# Patient Record
Sex: MALE
[De-identification: names, ages, dates, MRNs, and addresses within clinical notes are randomized per-mention and may not be internally consistent; named-entity substitution may affect disease eponyms.]

---

## 2020-05-11 ENCOUNTER — HOSPITAL ENCOUNTER (OUTPATIENT)
Dept: HOSPITAL 90 - EDH | Age: 65
Setting detail: OBSERVATION
LOS: 2 days | Discharge: HOME | End: 2020-05-13
Attending: INTERNAL MEDICINE | Admitting: INTERNAL MEDICINE
Payer: COMMERCIAL

## 2020-05-11 VITALS — HEIGHT: 65 IN | WEIGHT: 198.2 LBS | BODY MASS INDEX: 33.02 KG/M2

## 2020-05-11 VITALS — DIASTOLIC BLOOD PRESSURE: 85 MMHG | SYSTOLIC BLOOD PRESSURE: 127 MMHG

## 2020-05-11 DIAGNOSIS — F13.20: ICD-10-CM

## 2020-05-11 DIAGNOSIS — E78.2: ICD-10-CM

## 2020-05-11 DIAGNOSIS — E78.00: ICD-10-CM

## 2020-05-11 DIAGNOSIS — G47.00: ICD-10-CM

## 2020-05-11 DIAGNOSIS — Z79.899: ICD-10-CM

## 2020-05-11 DIAGNOSIS — N18.2: ICD-10-CM

## 2020-05-11 DIAGNOSIS — E66.9: ICD-10-CM

## 2020-05-11 DIAGNOSIS — I25.119: ICD-10-CM

## 2020-05-11 DIAGNOSIS — Z95.5: ICD-10-CM

## 2020-05-11 DIAGNOSIS — K57.30: ICD-10-CM

## 2020-05-11 DIAGNOSIS — Z91.041: ICD-10-CM

## 2020-05-11 DIAGNOSIS — G47.33: ICD-10-CM

## 2020-05-11 DIAGNOSIS — I25.2: ICD-10-CM

## 2020-05-11 DIAGNOSIS — K21.9: ICD-10-CM

## 2020-05-11 DIAGNOSIS — Z79.82: ICD-10-CM

## 2020-05-11 DIAGNOSIS — R07.89: Primary | ICD-10-CM

## 2020-05-11 DIAGNOSIS — I13.10: ICD-10-CM

## 2020-05-11 DIAGNOSIS — F32.0: ICD-10-CM

## 2020-05-11 LAB
ALBUMIN SERPL-MCNC: 3.5 G/DL (ref 3.5–5)
ALT SERPL-CCNC: 37 U/L (ref 12–78)
APTT PPP: 25.4 SEC (ref 26.3–35.5)
AST SERPL-CCNC: 23 U/L (ref 10–37)
BASOPHILS NFR BLD AUTO: 0.5 % (ref 0–5)
BILIRUB SERPL-MCNC: 0.3 MG/DL (ref 0.2–1)
BUN SERPL-MCNC: 20 MG/DL (ref 7–18)
CHLORIDE SERPL-SCNC: 109 MMOL/L (ref 101–111)
CHOLEST SERPL-MCNC: 173 MG/DL (ref ?–200)
CK SERPL-CCNC: 142 U/L (ref 21–232)
CO2 SERPL-SCNC: 29 MMOL/L (ref 21–32)
CREAT SERPL-MCNC: 1.5 MG/DL (ref 0.5–1.5)
EOSINOPHIL NFR BLD AUTO: 2.3 % (ref 0–8)
ERYTHROCYTE [DISTWIDTH] IN BLOOD BY AUTOMATED COUNT: 12.2 % (ref 11–15.5)
GFR SERPL CREATININE-BSD FRML MDRD: 50 ML/MIN (ref 60–?)
GLUCOSE SERPL-MCNC: 138 MG/DL (ref 70–105)
HBA1C MFR BLD: 5.3 % (ref 4–6)
HCT VFR BLD AUTO: 42.1 % (ref 42–54)
HDLC SERPL-MCNC: 110 MG/DL (ref 29–71)
INR PPP: 0.89 (ref 0.85–1.15)
LDLC SERPL CALC-MCNC: 86 MG/DL (ref 0–99)
LYMPHOCYTES NFR SPEC AUTO: 28.1 % (ref 21–51)
MCH RBC QN AUTO: 30.5 PG (ref 27–33)
MCHC RBC AUTO-ENTMCNC: 33.7 G/DL (ref 32–36)
MCV RBC AUTO: 90.5 FL (ref 79–99)
MONOCYTES NFR BLD AUTO: 6.2 % (ref 3–13)
NEUTROPHILS NFR BLD AUTO: 62.7 % (ref 40–77)
NRBC BLD MANUAL-RTO: 0 % (ref 0–0.19)
PLATELET # BLD AUTO: 235 K/UL (ref 130–400)
POTASSIUM SERPL-SCNC: 3.8 MMOL/L (ref 3.5–5.1)
PROT SERPL-MCNC: 6.8 G/DL (ref 6–8.3)
PROTHROMBIN TIME: 9.7 SEC (ref 9.6–11.6)
RBC # BLD AUTO: 4.65 MIL/UL (ref 4.5–6.2)
SODIUM SERPL-SCNC: 145 MMOL/L (ref 136–145)
TRIGL SERPL-MCNC: 438 MG/DL (ref 30–200)
TSH SERPL DL<=0.05 MIU/L-ACNC: 2.15 UIU/ML (ref 0.36–3.74)
WBC # BLD AUTO: 8.4 K/UL (ref 4.8–10.8)

## 2020-05-11 PROCEDURE — 71045 X-RAY EXAM CHEST 1 VIEW: CPT

## 2020-05-11 PROCEDURE — 85025 COMPLETE CBC W/AUTO DIFF WBC: CPT

## 2020-05-11 PROCEDURE — 80061 LIPID PANEL: CPT

## 2020-05-11 PROCEDURE — 96374 THER/PROPH/DIAG INJ IV PUSH: CPT

## 2020-05-11 PROCEDURE — 84443 ASSAY THYROID STIM HORMONE: CPT

## 2020-05-11 PROCEDURE — 83880 ASSAY OF NATRIURETIC PEPTIDE: CPT

## 2020-05-11 PROCEDURE — 96372 THER/PROPH/DIAG INJ SC/IM: CPT

## 2020-05-11 PROCEDURE — 85730 THROMBOPLASTIN TIME PARTIAL: CPT

## 2020-05-11 PROCEDURE — 83036 HEMOGLOBIN GLYCOSYLATED A1C: CPT

## 2020-05-11 PROCEDURE — 36415 COLL VENOUS BLD VENIPUNCTURE: CPT

## 2020-05-11 PROCEDURE — 80048 BASIC METABOLIC PNL TOTAL CA: CPT

## 2020-05-11 PROCEDURE — 78452 HT MUSCLE IMAGE SPECT MULT: CPT

## 2020-05-11 PROCEDURE — 85610 PROTHROMBIN TIME: CPT

## 2020-05-11 PROCEDURE — 80053 COMPREHEN METABOLIC PANEL: CPT

## 2020-05-11 PROCEDURE — 82550 ASSAY OF CK (CPK): CPT

## 2020-05-11 PROCEDURE — 99285 EMERGENCY DEPT VISIT HI MDM: CPT

## 2020-05-11 PROCEDURE — 93017 CV STRESS TEST TRACING ONLY: CPT

## 2020-05-11 PROCEDURE — 93005 ELECTROCARDIOGRAM TRACING: CPT

## 2020-05-11 PROCEDURE — 82948 REAGENT STRIP/BLOOD GLUCOSE: CPT

## 2020-05-11 PROCEDURE — 84484 ASSAY OF TROPONIN QUANT: CPT

## 2020-05-11 RX ADMIN — FAMOTIDINE SCH MG: 20 TABLET, FILM COATED ORAL at 21:00

## 2020-05-11 RX ADMIN — Medication SCH MG: at 21:00

## 2020-05-11 NOTE — NUR
Admission note:



Admitted to floor per stretcher. Fully awake and responsive. Denies feeling of chest pain at 
this pain. Placed in bed comfortably. Amb indep. VS checked and recorded. Full assessment 
done. ( See CPOE flow chart) Tele attached at bedside with SR. Oriented to room and use of 
call light. Policies and procedures explained. Agreed and verbalized understanding. Plan of 
care initiated. Kept monitored and observed for any unusualiaties. Distress / discomfort not 
noted. Needs attended.

## 2020-05-12 VITALS — SYSTOLIC BLOOD PRESSURE: 149 MMHG | DIASTOLIC BLOOD PRESSURE: 88 MMHG

## 2020-05-12 VITALS — DIASTOLIC BLOOD PRESSURE: 88 MMHG | SYSTOLIC BLOOD PRESSURE: 128 MMHG

## 2020-05-12 VITALS — DIASTOLIC BLOOD PRESSURE: 78 MMHG | SYSTOLIC BLOOD PRESSURE: 130 MMHG

## 2020-05-12 VITALS — DIASTOLIC BLOOD PRESSURE: 71 MMHG | SYSTOLIC BLOOD PRESSURE: 134 MMHG

## 2020-05-12 VITALS — SYSTOLIC BLOOD PRESSURE: 128 MMHG | DIASTOLIC BLOOD PRESSURE: 85 MMHG

## 2020-05-12 VITALS — SYSTOLIC BLOOD PRESSURE: 138 MMHG | DIASTOLIC BLOOD PRESSURE: 96 MMHG

## 2020-05-12 LAB
BASOPHILS NFR BLD AUTO: 0.5 % (ref 0–5)
BUN SERPL-MCNC: 17 MG/DL (ref 7–18)
CHLORIDE SERPL-SCNC: 108 MMOL/L (ref 101–111)
CO2 SERPL-SCNC: 29 MMOL/L (ref 21–32)
CREAT SERPL-MCNC: 1.3 MG/DL (ref 0.5–1.5)
EOSINOPHIL NFR BLD AUTO: 3 % (ref 0–8)
ERYTHROCYTE [DISTWIDTH] IN BLOOD BY AUTOMATED COUNT: 12.6 % (ref 11–15.5)
GFR SERPL CREATININE-BSD FRML MDRD: 59 ML/MIN (ref 60–?)
GLUCOSE SERPL-MCNC: 100 MG/DL (ref 70–105)
HCT VFR BLD AUTO: 40 % (ref 42–54)
LYMPHOCYTES NFR SPEC AUTO: 26.1 % (ref 21–51)
MCH RBC QN AUTO: 30 PG (ref 27–33)
MCHC RBC AUTO-ENTMCNC: 32.8 G/DL (ref 32–36)
MCV RBC AUTO: 91.5 FL (ref 79–99)
MONOCYTES NFR BLD AUTO: 8.8 % (ref 3–13)
NEUTROPHILS NFR BLD AUTO: 61.3 % (ref 40–77)
NRBC BLD MANUAL-RTO: 0 % (ref 0–0.19)
PLATELET # BLD AUTO: 211 K/UL (ref 130–400)
POTASSIUM SERPL-SCNC: 3.9 MMOL/L (ref 3.5–5.1)
RBC # BLD AUTO: 4.37 MIL/UL (ref 4.5–6.2)
SODIUM SERPL-SCNC: 144 MMOL/L (ref 136–145)
WBC # BLD AUTO: 8 K/UL (ref 4.8–10.8)

## 2020-05-12 RX ADMIN — Medication SCH MG: at 08:18

## 2020-05-12 RX ADMIN — Medication SCH MG: at 21:33

## 2020-05-12 RX ADMIN — FAMOTIDINE SCH MG: 20 TABLET, FILM COATED ORAL at 08:18

## 2020-05-12 RX ADMIN — ENOXAPARIN SODIUM SCH MG: 40 INJECTION SUBCUTANEOUS at 08:19

## 2020-05-12 NOTE — NUR
1025 patient signed FONTENOT Letter, I faxed MOON Letter to [phone] and placed in chart under 
consent tab.

## 2020-05-12 NOTE — NUR
INITIAL CM NOTE-

STATES LIVES WITH SPOUSE, SYDNEY MARTINEZ, FACE SHEET UPDATED WITH CONTACT NUMBER



PT INDEPENDENT, ACTIVE, DRIVES, NO PETE,  [facility] HOME  




-------------------------------------------------------------------------------

Addendum: 05/12/20 at 1557 by MALA MCDONALD RN CM

-------------------------------------------------------------------------------

Amended: Links added.

## 2020-05-13 VITALS — DIASTOLIC BLOOD PRESSURE: 90 MMHG | SYSTOLIC BLOOD PRESSURE: 138 MMHG

## 2020-05-13 VITALS — SYSTOLIC BLOOD PRESSURE: 147 MMHG | DIASTOLIC BLOOD PRESSURE: 90 MMHG

## 2020-05-13 VITALS — DIASTOLIC BLOOD PRESSURE: 85 MMHG | SYSTOLIC BLOOD PRESSURE: 131 MMHG

## 2020-05-13 VITALS — DIASTOLIC BLOOD PRESSURE: 95 MMHG | SYSTOLIC BLOOD PRESSURE: 142 MMHG

## 2020-05-13 RX ADMIN — Medication SCH MG: at 09:53

## 2020-05-13 RX ADMIN — ENOXAPARIN SODIUM SCH MG: 40 INJECTION SUBCUTANEOUS at 09:53

## 2020-05-13 NOTE — NUR
CALL FROM DR BERGMAN REGARDING UPDATE ON STRESS TEST REPORT.. STRESS TEST REPORT PENDING AT 
THIS TIME. PATIENT ASYMPTOMATIC AND WANTING TO GO HOME AND AGREED TO GO TO MD OFFICE FOR 
RESULTS  .LET MD KNOW CURRENTLY CNA CHECKED BP AND /104 BUT AS PER PATIENT STATED 
FEELS FRUSTRATED WAITING AND HAD JUST FINISHED WALKING .  PER MD WILL CONT TO PLACE ORDER 
FOR DISCHARGE HOME AND FOLLOW UP IN OFFICE FOR RESULTS.

## 2020-05-13 NOTE — NUR
DISCHARGE

RECEIVED DISCHARGE ORDERS FROM DR BERGMAN AT 1930.  PATIENT WAS INFORMED OF DISCHARGE AND IV 
REMOVED FROM RIGHT ARM WITH CATHETER INTACT.  TELEMONITOR WAS REMOVED AND SENT TO SECOND 
FLOOR.  DISCHARGE INSTRUCTION AND INFORMATION ON NEW MEDS GIVEN TO PATIENT.  NEW 
PRESCRIPTIONS WERE SENT TO PHARMACY OF PATIENT'S CHOICE.  PATIENT WAS THEN ASSISTED 
DOWNSTAIRS VIA WHEEL CHAIR WITH ALL BELONGINGS AT SIDE TO HIS AWAITING DAUGHTER AT THE ED 
ENTRANCE.  PATIENT WAS INSTRUCTED IF HE NEEDED TO COME BACK TO  THE ED TO PLEASE DO SO.

## 2021-03-16 ENCOUNTER — HOSPITAL ENCOUNTER (OUTPATIENT)
Dept: HOSPITAL 90 - SHCH | Age: 66
Discharge: HOME | End: 2021-03-16
Attending: INTERNAL MEDICINE
Payer: COMMERCIAL

## 2021-03-16 DIAGNOSIS — I25.10: ICD-10-CM

## 2021-03-16 DIAGNOSIS — I51.7: Primary | ICD-10-CM

## 2021-03-16 PROCEDURE — 93306 TTE W/DOPPLER COMPLETE: CPT

## 2021-03-16 PROCEDURE — 93356 MYOCRD STRAIN IMG SPCKL TRCK: CPT

## 2023-04-25 ENCOUNTER — HOSPITAL ENCOUNTER (OUTPATIENT)
Dept: HOSPITAL 90 - SHCH | Age: 68
Discharge: HOME | End: 2023-04-25
Attending: INTERNAL MEDICINE
Payer: COMMERCIAL

## 2023-04-25 DIAGNOSIS — Z79.899: ICD-10-CM

## 2023-04-25 DIAGNOSIS — I13.10: ICD-10-CM

## 2023-04-25 DIAGNOSIS — E78.5: ICD-10-CM

## 2023-04-25 DIAGNOSIS — G47.33: ICD-10-CM

## 2023-04-25 DIAGNOSIS — Z79.82: ICD-10-CM

## 2023-04-25 DIAGNOSIS — K21.9: ICD-10-CM

## 2023-04-25 DIAGNOSIS — I21.09: Primary | ICD-10-CM

## 2023-04-25 DIAGNOSIS — Z95.5: ICD-10-CM

## 2023-04-25 DIAGNOSIS — I25.10: ICD-10-CM

## 2023-04-25 DIAGNOSIS — N18.30: ICD-10-CM

## 2023-04-25 DIAGNOSIS — I25.2: ICD-10-CM

## 2023-04-25 PROCEDURE — 96374 THER/PROPH/DIAG INJ IV PUSH: CPT

## 2023-04-25 PROCEDURE — 78452 HT MUSCLE IMAGE SPECT MULT: CPT

## 2023-04-25 PROCEDURE — 93017 CV STRESS TEST TRACING ONLY: CPT
